# Patient Record
Sex: FEMALE | Race: BLACK OR AFRICAN AMERICAN | NOT HISPANIC OR LATINO | URBAN - METROPOLITAN AREA
[De-identification: names, ages, dates, MRNs, and addresses within clinical notes are randomized per-mention and may not be internally consistent; named-entity substitution may affect disease eponyms.]

---

## 2020-07-17 ENCOUNTER — OUTPATIENT (OUTPATIENT)
Dept: OUTPATIENT SERVICES | Facility: HOSPITAL | Age: 35
LOS: 1 days | Discharge: HOME | End: 2020-07-17
Payer: COMMERCIAL

## 2020-07-17 DIAGNOSIS — D25.9 LEIOMYOMA OF UTERUS, UNSPECIFIED: ICD-10-CM

## 2020-07-17 PROCEDURE — 76830 TRANSVAGINAL US NON-OB: CPT | Mod: 26

## 2020-10-07 ENCOUNTER — OUTPATIENT (OUTPATIENT)
Dept: OUTPATIENT SERVICES | Facility: HOSPITAL | Age: 35
LOS: 1 days | Discharge: HOME | End: 2020-10-07
Payer: COMMERCIAL

## 2020-10-07 DIAGNOSIS — D25.9 LEIOMYOMA OF UTERUS, UNSPECIFIED: ICD-10-CM

## 2020-10-07 PROCEDURE — 76830 TRANSVAGINAL US NON-OB: CPT | Mod: 26

## 2021-11-30 ENCOUNTER — EMERGENCY (EMERGENCY)
Facility: HOSPITAL | Age: 36
LOS: 0 days | Discharge: HOME | End: 2021-11-30
Attending: EMERGENCY MEDICINE | Admitting: EMERGENCY MEDICINE
Payer: COMMERCIAL

## 2021-11-30 VITALS
OXYGEN SATURATION: 99 % | HEART RATE: 56 BPM | DIASTOLIC BLOOD PRESSURE: 66 MMHG | TEMPERATURE: 98 F | RESPIRATION RATE: 18 BRPM | SYSTOLIC BLOOD PRESSURE: 119 MMHG

## 2021-11-30 DIAGNOSIS — R11.2 NAUSEA WITH VOMITING, UNSPECIFIED: ICD-10-CM

## 2021-11-30 DIAGNOSIS — R63.0 ANOREXIA: ICD-10-CM

## 2021-11-30 DIAGNOSIS — Z88.8 ALLERGY STATUS TO OTHER DRUGS, MEDICAMENTS AND BIOLOGICAL SUBSTANCES STATUS: ICD-10-CM

## 2021-11-30 LAB
ALBUMIN SERPL ELPH-MCNC: 4.1 G/DL — SIGNIFICANT CHANGE UP (ref 3.5–5.2)
ALP SERPL-CCNC: 48 U/L — SIGNIFICANT CHANGE UP (ref 30–115)
ALT FLD-CCNC: 16 U/L — SIGNIFICANT CHANGE UP (ref 0–41)
ANION GAP SERPL CALC-SCNC: 15 MMOL/L — HIGH (ref 7–14)
APPEARANCE UR: CLEAR — SIGNIFICANT CHANGE UP
AST SERPL-CCNC: 26 U/L — SIGNIFICANT CHANGE UP (ref 0–41)
BASOPHILS # BLD AUTO: 0 K/UL — SIGNIFICANT CHANGE UP (ref 0–0.2)
BASOPHILS NFR BLD AUTO: 0 % — SIGNIFICANT CHANGE UP (ref 0–1)
BILIRUB SERPL-MCNC: 0.2 MG/DL — SIGNIFICANT CHANGE UP (ref 0.2–1.2)
BILIRUB UR-MCNC: NEGATIVE — SIGNIFICANT CHANGE UP
BUN SERPL-MCNC: 6 MG/DL — LOW (ref 10–20)
CALCIUM SERPL-MCNC: 8.7 MG/DL — SIGNIFICANT CHANGE UP (ref 8.5–10.1)
CHLORIDE SERPL-SCNC: 101 MMOL/L — SIGNIFICANT CHANGE UP (ref 98–110)
CO2 SERPL-SCNC: 20 MMOL/L — SIGNIFICANT CHANGE UP (ref 17–32)
COLOR SPEC: SIGNIFICANT CHANGE UP
CREAT SERPL-MCNC: 0.8 MG/DL — SIGNIFICANT CHANGE UP (ref 0.7–1.5)
DIFF PNL FLD: NEGATIVE — SIGNIFICANT CHANGE UP
EOSINOPHIL # BLD AUTO: 0.04 K/UL — SIGNIFICANT CHANGE UP (ref 0–0.7)
EOSINOPHIL NFR BLD AUTO: 1.7 % — SIGNIFICANT CHANGE UP (ref 0–8)
GIANT PLATELETS BLD QL SMEAR: PRESENT — SIGNIFICANT CHANGE UP
GLUCOSE SERPL-MCNC: 92 MG/DL — SIGNIFICANT CHANGE UP (ref 70–99)
GLUCOSE UR QL: NEGATIVE — SIGNIFICANT CHANGE UP
HCT VFR BLD CALC: 40.8 % — SIGNIFICANT CHANGE UP (ref 37–47)
HGB BLD-MCNC: 13.5 G/DL — SIGNIFICANT CHANGE UP (ref 12–16)
KETONES UR-MCNC: NEGATIVE — SIGNIFICANT CHANGE UP
LACTATE SERPL-SCNC: 0.9 MMOL/L — SIGNIFICANT CHANGE UP (ref 0.7–2)
LEUKOCYTE ESTERASE UR-ACNC: NEGATIVE — SIGNIFICANT CHANGE UP
LIDOCAIN IGE QN: 15 U/L — SIGNIFICANT CHANGE UP (ref 7–60)
LYMPHOCYTES # BLD AUTO: 1.48 K/UL — SIGNIFICANT CHANGE UP (ref 1.2–3.4)
LYMPHOCYTES # BLD AUTO: 56.4 % — HIGH (ref 20.5–51.1)
MACROCYTES BLD QL: SLIGHT — SIGNIFICANT CHANGE UP
MANUAL SMEAR VERIFICATION: SIGNIFICANT CHANGE UP
MCHC RBC-ENTMCNC: 30.8 PG — SIGNIFICANT CHANGE UP (ref 27–31)
MCHC RBC-ENTMCNC: 33.1 G/DL — SIGNIFICANT CHANGE UP (ref 32–37)
MCV RBC AUTO: 92.9 FL — SIGNIFICANT CHANGE UP (ref 81–99)
MICROCYTES BLD QL: SLIGHT — SIGNIFICANT CHANGE UP
MONOCYTES # BLD AUTO: 0.16 K/UL — SIGNIFICANT CHANGE UP (ref 0.1–0.6)
MONOCYTES NFR BLD AUTO: 6 % — SIGNIFICANT CHANGE UP (ref 1.7–9.3)
NEUTROPHILS # BLD AUTO: 0.61 K/UL — LOW (ref 1.4–6.5)
NEUTROPHILS NFR BLD AUTO: 20.5 % — LOW (ref 42.2–75.2)
NEUTS BAND # BLD: 2.6 % — SIGNIFICANT CHANGE UP (ref 0–6)
NITRITE UR-MCNC: NEGATIVE — SIGNIFICANT CHANGE UP
OVALOCYTES BLD QL SMEAR: SLIGHT — SIGNIFICANT CHANGE UP
PH UR: 6.5 — SIGNIFICANT CHANGE UP (ref 5–8)
PLAT MORPH BLD: NORMAL — SIGNIFICANT CHANGE UP
PLATELET # BLD AUTO: 240 K/UL — SIGNIFICANT CHANGE UP (ref 130–400)
POLYCHROMASIA BLD QL SMEAR: SLIGHT — SIGNIFICANT CHANGE UP
POTASSIUM SERPL-MCNC: 4.3 MMOL/L — SIGNIFICANT CHANGE UP (ref 3.5–5)
POTASSIUM SERPL-SCNC: 4.3 MMOL/L — SIGNIFICANT CHANGE UP (ref 3.5–5)
PROT SERPL-MCNC: 6.8 G/DL — SIGNIFICANT CHANGE UP (ref 6–8)
PROT UR-MCNC: NEGATIVE — SIGNIFICANT CHANGE UP
RBC # BLD: 4.39 M/UL — SIGNIFICANT CHANGE UP (ref 4.2–5.4)
RBC # FLD: 12.2 % — SIGNIFICANT CHANGE UP (ref 11.5–14.5)
RBC BLD AUTO: ABNORMAL
SODIUM SERPL-SCNC: 136 MMOL/L — SIGNIFICANT CHANGE UP (ref 135–146)
SP GR SPEC: 1.01 — SIGNIFICANT CHANGE UP (ref 1.01–1.03)
UROBILINOGEN FLD QL: SIGNIFICANT CHANGE UP
VARIANT LYMPHS # BLD: 12.8 % — HIGH (ref 0–5)
WBC # BLD: 2.62 K/UL — LOW (ref 4.8–10.8)
WBC # FLD AUTO: 2.62 K/UL — LOW (ref 4.8–10.8)

## 2021-11-30 PROCEDURE — 99284 EMERGENCY DEPT VISIT MOD MDM: CPT

## 2021-11-30 RX ORDER — SODIUM CHLORIDE 9 MG/ML
1000 INJECTION INTRAMUSCULAR; INTRAVENOUS; SUBCUTANEOUS ONCE
Refills: 0 | Status: COMPLETED | OUTPATIENT
Start: 2021-11-30 | End: 2021-11-30

## 2021-11-30 RX ORDER — ONDANSETRON 8 MG/1
4 TABLET, FILM COATED ORAL ONCE
Refills: 0 | Status: COMPLETED | OUTPATIENT
Start: 2021-11-30 | End: 2021-11-30

## 2021-11-30 RX ADMIN — ONDANSETRON 4 MILLIGRAM(S): 8 TABLET, FILM COATED ORAL at 13:49

## 2021-11-30 RX ADMIN — SODIUM CHLORIDE 1000 MILLILITER(S): 9 INJECTION INTRAMUSCULAR; INTRAVENOUS; SUBCUTANEOUS at 13:49

## 2021-11-30 NOTE — ED PROVIDER NOTE - CARE PROVIDER_API CALL
Chayo Sanabria (MD)  Gastroenterology  [address] [place] [address]  Phone: [phone]  Fax: [phone]  Follow Up Time: 1-3 Days

## 2021-11-30 NOTE — ED PROVIDER NOTE - NSFOLLOWUPINSTRUCTIONS_ED_ALL_ED_FT

## 2021-11-30 NOTE — ED PROVIDER NOTE - NS ED ROS FT
Constitutional: (-) fever (-) malaise (-) diaphoresis (-) chills (-) wt. loss (-) body aches (-) night sweats  Eyes: (-) visual changes (-) eye pain (-) eye discharge (-) photophobia (-) FB sensation  ENMT: (-) nasal or chest congestion (-) runny nose (-) sore throat (-) hoarseness  (-) hearing changes (-) ear pain (-) ear discharge or infections (-) neck pain (-) neck stiffness  Cardiac: (-) chest pain  (-) palpitations (-) syncope (-) edema  Respiratory: (-) cough (-) SOB (-) LAW  GI: (+) nausea (+) vomiting (-) diarrhea (-) abdominal pain  : (-) dysuria (-) increased frequency  (-) hematuria (-) incontinence  Neuro: (-) headache (-) dizziness (-) numbness/tingling to extremities B/L (-) weakness  Skin: (-) rash (-) laceration  GYN: (-) pelvic pain (-) abnormal bleeding (-) abnormal discharge or odor  Except as documented in the HPI, all other systems are negative.

## 2021-11-30 NOTE — ED PROVIDER NOTE - OBJECTIVE STATEMENT
35 yo F no pmhx presenting to the ED for evaluation of intermittent nausea and NBNB vomiting x 4 days. Pt was seen at [facility] and diagnosed with viral gastroenteritis, pt given PO zofran, states after taking zofran this am she had an episode of vomiting, unable to tolerate PO, poor appetite. Pt denies any sick contacts. Pt reports a few days ago she had chills, resolved. Denies any alleviating or provoking factors. LMP 11/11/21. Denies fever, abd pain, diarrhea, chest pain, sob, dysuria, hematuria, vaginal bleeding, vaginal discharge.

## 2021-11-30 NOTE — ED PROVIDER NOTE - PATIENT PORTAL LINK FT
You can access the FollowMyHealth Patient Portal offered by [facility] by registering at the following website: [email or web address]. By joining [employer]’s FollowMyHealth portal, you will also be able to view your health information using other applications (apps) compatible with our system.

## 2021-11-30 NOTE — ED PROVIDER NOTE - PHYSICAL EXAMINATION
GENERAL: Well-nourished, Well-developed. NAD.  HEAD: No visible or palpable bumps or hematomas. No ecchymosis behind ears B/L.  Eyes: PERRLA, EOMI. No asymmetry. No nystagmus. No conjunctival injection. Non-icteric sclera.  ENMT: MMM.   Neck: Supple. FROM  CVS: RRR. Normal S1,S2. No murmurs appreciated on auscultation   RESP: Lungs clear to auscultation B/L. No wheezing, rales, or rhonchi auscultated.  GI: Normal auscultation of bowel sounds in all 4 quadrants. Soft, Nontender, Nondistended. No guarding or rebound tenderness. No CVAT B/L.  Skin: Warm, Dry. No rashes or lesions. Good cap refill < 2 sec B/L.  EXT: Radial and pedal pulses present B/L. No calf tenderness or swelling B/L. No palpable cords. No pedal edema B/L.  Neuro: AA&O x 3. Sensation grossly intact. Strength 5/5 B/L. Gait within normal limits.

## 2021-11-30 NOTE — ED PROVIDER NOTE - PROGRESS NOTE DETAILS
NC: Pt feeling much better, would like to be discharged at this time. abdomen soft, nontender. VSS. tolerating PO Attending Note: I personally evaluated the patient. I reviewed the Physician Assistant’s note (as assigned above), and agree with the findings and plan except as documented in my note.   35 y/o F now with viral gastroenteritis presents for NBNB vomiting. Reports no specific abd pain. Exam: NT, well appearing, pink conjunctiva, anicteric, abd soft. Plan: Labs, reassess.

## 2021-12-01 LAB
CULTURE RESULTS: SIGNIFICANT CHANGE UP
SPECIMEN SOURCE: SIGNIFICANT CHANGE UP

## 2024-02-23 ENCOUNTER — APPOINTMENT (OUTPATIENT)
Dept: ORTHOPEDIC SURGERY | Facility: CLINIC | Age: 39
End: 2024-02-23
Payer: COMMERCIAL

## 2024-02-23 ENCOUNTER — NON-APPOINTMENT (OUTPATIENT)
Age: 39
End: 2024-02-23

## 2024-02-23 VITALS — BODY MASS INDEX: 32.39 KG/M2 | WEIGHT: 165 LBS | HEIGHT: 60 IN

## 2024-02-23 DIAGNOSIS — M25.561 PAIN IN RIGHT KNEE: ICD-10-CM

## 2024-02-23 DIAGNOSIS — M25.571 PAIN IN RIGHT ANKLE AND JOINTS OF RIGHT FOOT: ICD-10-CM

## 2024-02-23 DIAGNOSIS — G89.29 PAIN IN RIGHT KNEE: ICD-10-CM

## 2024-02-23 DIAGNOSIS — G89.29 PAIN IN RIGHT ANKLE AND JOINTS OF RIGHT FOOT: ICD-10-CM

## 2024-02-23 PROBLEM — Z00.00 ENCOUNTER FOR PREVENTIVE HEALTH EXAMINATION: Status: ACTIVE | Noted: 2024-02-23

## 2024-02-23 PROCEDURE — 73610 X-RAY EXAM OF ANKLE: CPT | Mod: RT

## 2024-02-23 PROCEDURE — 99203 OFFICE O/P NEW LOW 30 MIN: CPT

## 2024-02-23 PROCEDURE — 73560 X-RAY EXAM OF KNEE 1 OR 2: CPT | Mod: RT

## 2024-02-23 NOTE — IMAGING
[de-identified] : [place] young woman walks into my office no distress.  Physical examination: Right knee: No joint line tenderness to palpation along medial and lateral joint line.  Mildly abnormal patellofemoral grind test.  Most of her tenderness along the patella tendon.  No significant synovial thickening.  No effusion.  Negative Apley's and Marc's test.  Negative Lachman test.  No varus valgus instability.  Negative Lachman test.  Knee motion 0-140 degrees without associated pain.  Calf soft no cords.  No geniculate lymph nodes or masses.  Gait nonantalgic.  Right ankle: No synovial thickening.  No effusion.  Full range of motion (dorsiflexion 20 degrees plantarflexion 40 degrees inversion 30 degrees inversion 20 degrees.  Some mild discomfort with forced eversion.  Provocative testing of the collateral ligaments laterally does not elicit discomfort but the patient localizes her pain to the distal fibulocalcaneal ligaments.  No swelling in this area.  Negative anterior drawer test.  Midfoot supple.  Arch acceptable.  Radiographs: Right knee (AP, lateral): No stigmata of arthritis.  No evidence of fracture.  No bony abnormalities.  Joint space greater than 5 mm medially and laterally.  No osteophytes around the patellofemoral joint.  Right ankle (AP, lateral, mortise): Beaking distal aspect of fibula consistent with prior injury to deep fibulocalcaneal ligaments.  Otherwise unremarkable examination.  No evidence of arthritis within the joint.  No other bony abnormalities.

## 2024-02-23 NOTE — HISTORY OF PRESENT ILLNESS
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  She notes the pain occurs mostly when she tries to run.  She is tried an ankle brace which may mitigate the pain somewhat.  Does not routinely take any medication for the pain.  Does not have pain at rest or with regular activities.  No clear symptoms of giving way or locking.  No history of trauma.  Does admit that she is put on a little bit of weight since she has had a back weight from her running.  Despite a history of running for prolonged distances in the past she is now down to 3 miles at a time to time basis.  Does not routinely stretch before or after running.  Has not had physical therapy.  Does not routinely take any medication for the pain.  Past medical history: Denies all.  Regular medical follow-up.  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

## 2024-02-23 NOTE — ASSESSMENT
[FreeTextEntry1] : 39-year-old avid runner with complaints of knee and ankle pain.  Knee pain is most likely related to patella tendinitis.  Recommend a course of physical therapy.  Judicious use of NSAIDs.  Physical examination and radiographic examination of the patient's right ankle suggested old deep lateral ankle sprain.  Now has some mild residual pain.  Recommend physical therapy focusing on ankle stabilization.  Will have her follow-up in my office in 6 months.  If she is doing particular well can cancel follow-up visit.  No new x-rays required.

## 2024-08-07 ENCOUNTER — TRANSCRIPTION ENCOUNTER (OUTPATIENT)
Age: 39
End: 2024-08-07

## 2024-08-07 ENCOUNTER — INPATIENT (INPATIENT)
Facility: HOSPITAL | Age: 39
LOS: 0 days | Discharge: HOME | DRG: 596 | End: 2024-08-08
Attending: STUDENT IN AN ORGANIZED HEALTH CARE EDUCATION/TRAINING PROGRAM | Admitting: STUDENT IN AN ORGANIZED HEALTH CARE EDUCATION/TRAINING PROGRAM
Payer: COMMERCIAL

## 2024-08-07 VITALS
DIASTOLIC BLOOD PRESSURE: 93 MMHG | RESPIRATION RATE: 14 BRPM | SYSTOLIC BLOOD PRESSURE: 160 MMHG | WEIGHT: 167.99 LBS | TEMPERATURE: 98 F | HEART RATE: 65 BPM | OXYGEN SATURATION: 99 %

## 2024-08-07 VITALS
TEMPERATURE: 98 F | SYSTOLIC BLOOD PRESSURE: 148 MMHG | HEART RATE: 82 BPM | RESPIRATION RATE: 18 BRPM | DIASTOLIC BLOOD PRESSURE: 72 MMHG | OXYGEN SATURATION: 98 %

## 2024-08-07 DIAGNOSIS — Z98.890 OTHER SPECIFIED POSTPROCEDURAL STATES: Chronic | ICD-10-CM

## 2024-08-07 DIAGNOSIS — R21 RASH AND OTHER NONSPECIFIC SKIN ERUPTION: ICD-10-CM

## 2024-08-07 DIAGNOSIS — Z98.891 HISTORY OF UTERINE SCAR FROM PREVIOUS SURGERY: Chronic | ICD-10-CM

## 2024-08-07 LAB
ALBUMIN SERPL ELPH-MCNC: 4.3 G/DL — SIGNIFICANT CHANGE UP (ref 3.5–5.2)
ALP SERPL-CCNC: 64 U/L — SIGNIFICANT CHANGE UP (ref 30–115)
ALT FLD-CCNC: 14 U/L — SIGNIFICANT CHANGE UP (ref 0–41)
ANION GAP SERPL CALC-SCNC: 10 MMOL/L — SIGNIFICANT CHANGE UP (ref 7–14)
AST SERPL-CCNC: 14 U/L — SIGNIFICANT CHANGE UP (ref 0–41)
BASOPHILS # BLD AUTO: 0.04 K/UL — SIGNIFICANT CHANGE UP (ref 0–0.2)
BASOPHILS NFR BLD AUTO: 0.5 % — SIGNIFICANT CHANGE UP (ref 0–1)
BILIRUB SERPL-MCNC: 0.3 MG/DL — SIGNIFICANT CHANGE UP (ref 0.2–1.2)
BUN SERPL-MCNC: 15 MG/DL — SIGNIFICANT CHANGE UP (ref 10–20)
CALCIUM SERPL-MCNC: 9.4 MG/DL — SIGNIFICANT CHANGE UP (ref 8.4–10.4)
CHLORIDE SERPL-SCNC: 104 MMOL/L — SIGNIFICANT CHANGE UP (ref 98–110)
CO2 SERPL-SCNC: 26 MMOL/L — SIGNIFICANT CHANGE UP (ref 17–32)
CREAT SERPL-MCNC: 0.8 MG/DL — SIGNIFICANT CHANGE UP (ref 0.7–1.5)
EGFR: 95 ML/MIN/1.73M2 — SIGNIFICANT CHANGE UP
EOSINOPHIL # BLD AUTO: 0.01 K/UL — SIGNIFICANT CHANGE UP (ref 0–0.7)
EOSINOPHIL NFR BLD AUTO: 0.1 % — SIGNIFICANT CHANGE UP (ref 0–8)
ERYTHROCYTE [SEDIMENTATION RATE] IN BLOOD: 4 MM/HR — SIGNIFICANT CHANGE UP (ref 0–20)
GLUCOSE SERPL-MCNC: 98 MG/DL — SIGNIFICANT CHANGE UP (ref 70–99)
HCT VFR BLD CALC: 37.9 % — SIGNIFICANT CHANGE UP (ref 37–47)
HGB BLD-MCNC: 12.6 G/DL — SIGNIFICANT CHANGE UP (ref 12–16)
IMM GRANULOCYTES NFR BLD AUTO: 0.1 % — SIGNIFICANT CHANGE UP (ref 0.1–0.3)
LYMPHOCYTES # BLD AUTO: 3.38 K/UL — SIGNIFICANT CHANGE UP (ref 1.2–3.4)
LYMPHOCYTES # BLD AUTO: 43.7 % — SIGNIFICANT CHANGE UP (ref 20.5–51.1)
MCHC RBC-ENTMCNC: 30.4 PG — SIGNIFICANT CHANGE UP (ref 27–31)
MCHC RBC-ENTMCNC: 33.2 G/DL — SIGNIFICANT CHANGE UP (ref 32–37)
MCV RBC AUTO: 91.5 FL — SIGNIFICANT CHANGE UP (ref 81–99)
MONOCYTES # BLD AUTO: 0.46 K/UL — SIGNIFICANT CHANGE UP (ref 0.1–0.6)
MONOCYTES NFR BLD AUTO: 6 % — SIGNIFICANT CHANGE UP (ref 1.7–9.3)
NEUTROPHILS # BLD AUTO: 3.83 K/UL — SIGNIFICANT CHANGE UP (ref 1.4–6.5)
NEUTROPHILS NFR BLD AUTO: 49.6 % — SIGNIFICANT CHANGE UP (ref 42.2–75.2)
NRBC # BLD: 0 /100 WBCS — SIGNIFICANT CHANGE UP (ref 0–0)
PLATELET # BLD AUTO: 301 K/UL — SIGNIFICANT CHANGE UP (ref 130–400)
PMV BLD: 9.2 FL — SIGNIFICANT CHANGE UP (ref 7.4–10.4)
POTASSIUM SERPL-MCNC: 4 MMOL/L — SIGNIFICANT CHANGE UP (ref 3.5–5)
POTASSIUM SERPL-SCNC: 4 MMOL/L — SIGNIFICANT CHANGE UP (ref 3.5–5)
PROT SERPL-MCNC: 6.6 G/DL — SIGNIFICANT CHANGE UP (ref 6–8)
RBC # BLD: 4.14 M/UL — LOW (ref 4.2–5.4)
RBC # FLD: 13.1 % — SIGNIFICANT CHANGE UP (ref 11.5–14.5)
SODIUM SERPL-SCNC: 140 MMOL/L — SIGNIFICANT CHANGE UP (ref 135–146)
WBC # BLD: 7.73 K/UL — SIGNIFICANT CHANGE UP (ref 4.8–10.8)
WBC # FLD AUTO: 7.73 K/UL — SIGNIFICANT CHANGE UP (ref 4.8–10.8)

## 2024-08-07 PROCEDURE — 99285 EMERGENCY DEPT VISIT HI MDM: CPT

## 2024-08-07 PROCEDURE — 83520 IMMUNOASSAY QUANT NOS NONAB: CPT

## 2024-08-07 PROCEDURE — 86635 COCCIDIOIDES ANTIBODY: CPT

## 2024-08-07 PROCEDURE — 93005 ELECTROCARDIOGRAM TRACING: CPT

## 2024-08-07 PROCEDURE — 87529 HSV DNA AMP PROBE: CPT

## 2024-08-07 PROCEDURE — 36415 COLL VENOUS BLD VENIPUNCTURE: CPT

## 2024-08-07 PROCEDURE — 86038 ANTINUCLEAR ANTIBODIES: CPT

## 2024-08-07 PROCEDURE — 99223 1ST HOSP IP/OBS HIGH 75: CPT

## 2024-08-07 PROCEDURE — 99282 EMERGENCY DEPT VISIT SF MDM: CPT

## 2024-08-07 PROCEDURE — 86695 HERPES SIMPLEX TYPE 1 TEST: CPT

## 2024-08-07 PROCEDURE — 93010 ELECTROCARDIOGRAM REPORT: CPT

## 2024-08-07 PROCEDURE — 86780 TREPONEMA PALLIDUM: CPT

## 2024-08-07 PROCEDURE — 80074 ACUTE HEPATITIS PANEL: CPT

## 2024-08-07 PROCEDURE — 87389 HIV-1 AG W/HIV-1&-2 AB AG IA: CPT

## 2024-08-07 PROCEDURE — 86696 HERPES SIMPLEX TYPE 2 TEST: CPT

## 2024-08-07 PROCEDURE — 86738 MYCOPLASMA ANTIBODY: CPT

## 2024-08-07 PROCEDURE — G0378: CPT

## 2024-08-07 RX ORDER — DIPHENHYDRAMINE HYDROCHLORIDE AND LIDOCAINE HYDROCHLORIDE AND ALUMINUM HYDROXIDE AND MAGNESIUM HYDRO
15 KIT ONCE
Refills: 0 | Status: COMPLETED | OUTPATIENT
Start: 2024-08-07 | End: 2024-08-07

## 2024-08-07 RX ORDER — ACYCLOVIR 800 MG
400 TABLET ORAL EVERY 8 HOURS
Refills: 0 | Status: DISCONTINUED | OUTPATIENT
Start: 2024-08-07 | End: 2024-08-08

## 2024-08-07 RX ORDER — KETOROLAC TROMETHAMINE 10 MG
10 TABLET ORAL ONCE
Refills: 0 | Status: DISCONTINUED | OUTPATIENT
Start: 2024-08-07 | End: 2024-08-07

## 2024-08-07 RX ORDER — MAGNESIUM, ALUMINUM HYDROXIDE 200-225/5
30 SUSPENSION, ORAL (FINAL DOSE FORM) ORAL EVERY 4 HOURS
Refills: 0 | Status: ACTIVE | OUTPATIENT
Start: 2024-08-07 | End: 2025-07-06

## 2024-08-07 RX ORDER — CLOBETASOL PROPIONATE 0.5 MG/G
1 EMULSION TOPICAL
Refills: 0 | Status: DISCONTINUED | OUTPATIENT
Start: 2024-08-07 | End: 2024-08-08

## 2024-08-07 RX ORDER — MELATONIN 3 MG
3 TABLET ORAL AT BEDTIME
Refills: 0 | Status: ACTIVE | OUTPATIENT
Start: 2024-08-07 | End: 2025-07-06

## 2024-08-07 RX ORDER — ACETAMINOPHEN 500 MG
650 TABLET ORAL EVERY 6 HOURS
Refills: 0 | Status: ACTIVE | OUTPATIENT
Start: 2024-08-07 | End: 2025-07-06

## 2024-08-07 RX ORDER — DIPHENHYDRAMINE HCL 25 MG
50 CAPSULE ORAL ONCE
Refills: 0 | Status: COMPLETED | OUTPATIENT
Start: 2024-08-07 | End: 2024-08-07

## 2024-08-07 RX ORDER — DIPHENHYDRAMINE HCL 25 MG
25 CAPSULE ORAL ONCE
Refills: 0 | Status: COMPLETED | OUTPATIENT
Start: 2024-08-07 | End: 2024-08-07

## 2024-08-07 RX ORDER — AZITHROMYCIN 250 MG
500 TABLET ORAL DAILY
Refills: 0 | Status: DISCONTINUED | OUTPATIENT
Start: 2024-08-07 | End: 2024-08-08

## 2024-08-07 RX ORDER — KETOROLAC TROMETHAMINE 10 MG
30 TABLET ORAL ONCE
Refills: 0 | Status: DISCONTINUED | OUTPATIENT
Start: 2024-08-07 | End: 2024-08-07

## 2024-08-07 RX ORDER — ONDANSETRON HCL/PF 4 MG/2 ML
4 VIAL (ML) INJECTION EVERY 8 HOURS
Refills: 0 | Status: ACTIVE | OUTPATIENT
Start: 2024-08-07 | End: 2025-07-06

## 2024-08-07 RX ADMIN — Medication 500 MILLIGRAM(S): at 22:09

## 2024-08-07 RX ADMIN — CLOBETASOL PROPIONATE 1 APPLICATION(S): 0.5 EMULSION TOPICAL at 22:09

## 2024-08-07 RX ADMIN — Medication 30 MILLIGRAM(S): at 04:02

## 2024-08-07 RX ADMIN — Medication 30 MILLILITER(S): at 11:45

## 2024-08-07 RX ADMIN — Medication 650 MILLIGRAM(S): at 11:45

## 2024-08-07 RX ADMIN — DIPHENHYDRAMINE HYDROCHLORIDE AND LIDOCAINE HYDROCHLORIDE AND ALUMINUM HYDROXIDE AND MAGNESIUM HYDRO 15 MILLILITER(S): KIT at 20:23

## 2024-08-07 RX ADMIN — Medication 650 MILLIGRAM(S): at 19:06

## 2024-08-07 RX ADMIN — Medication 10 MILLIGRAM(S): at 20:23

## 2024-08-07 RX ADMIN — Medication 1 SPRAY(S): at 12:00

## 2024-08-07 RX ADMIN — Medication 102 MILLIGRAM(S): at 04:02

## 2024-08-07 RX ADMIN — Medication 25 MILLIGRAM(S): at 14:00

## 2024-08-07 RX ADMIN — Medication 50 MILLIGRAM(S): at 07:00

## 2024-08-07 RX ADMIN — Medication 30 MILLIGRAM(S): at 07:00

## 2024-08-07 RX ADMIN — Medication 400 MILLIGRAM(S): at 22:09

## 2024-08-07 NOTE — CONSULT NOTE ADULT - ATTENDING COMMENTS
Patient is a 39-year-old female w/ hx significant for Erythema multiforme (2023) who presents for worsening, diffuse painful rash with vesicles w/ mucosal involvement.    Derm consulted for diffuse erythematous rash that looks like target lesions with vesicles in the center of some lesions, with oral mucosa involvement in the context of 5 recurrent milder episodes of Erythema multiforme minor in the past year with no previous URI and reporting serology with Ob/Gyn showing past HSV exposure. Patient also had URI Sx about 1 week ago and took augmentin for 5 days then traveled to [place] 8/2-8/6. Denies any fever, chills, headaches, vision problems. Reports burning pain and itching around the rashes. She saw a dermatologist multiple times for recurrent EM and has tried multiple cream including Clobetasol, Triamcinolone, Betamethasone and Fluocinonide which in general help. 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      ASSESSMENT AND PLAN:  Reoccuring Erythema multiforme minor, with no other mucosal involvement at this time other than than L lip/ mucosal involvement- likely secondary to  HSV; r/o mycoplasma:    - Continue antiviral- inpatient Acyclovir or outpatient Valacyclovir erythema multiforme dose, as per ID recommendations- patient would like to be discharged soon to take care of her daughther and take the final exam Thursday. Agree with Azithrhomicin recommended for ID, it might help with healing of skin erosions like on the R elbow too.   - f/u on testing but m-pox unlikely in the context of 5 episodes of recurrent erythema multiforme lesions with target lesions.  - Consider topical steroids to decrease the tender inflammation as with past episodes it helped- Clobetasol or Fluocinonide cream, lidocaine 2% mouth wash or maalox for painful oral lesions  - Consider Claritin qam and Benadryl qhs for itching  Please call with any questions or worsening of skin lesions.
40 yo female w/ hx significant for Erythema multiforme (2023) presenting for worsening, diffuse painful rash with vesicles w/ mucosal involvement. Over the past year, patient has had 5 episodes of a rash that begins as a localized macule/papule that then spreads to diffuse targetoid lesions w/ vesicles. A few months ago, her dermatologist prescribed clobetasol and the rash improved.  She went to [place] on 8/2 and she noticed a red macule on her right elbow and was uncertain if she got bit by a mosquito. Her rash began spreading similar to prior episodes; she went to a pharmacy in [facility] where she purchased betamethasone, stated it helped a little bit. Pt stated she had a sore throat and SOB for one week for which she took Augmentin x 5 days one week before she left for [facility].    After returning from [facility], she noticed that the rash was getting worse with more vesicles so she went to the ED. She denies fevers, chills, myalgias, arthralgias; reports burning in her mouth from the ulcers; rash is on lips, L buccal mucosa, anterior/posterior thorax, and b/l UE/LE with palms and soles affected. She denies sick contacts; she lives with her daughter who has never had any rashes/lesions similar to hers. Per pt no other travel companions have a similar rash.     IMPRESSION  #Erythema Multiforme 2/2 mycoplasma pneumoniae   - lesions not c/w erythema migrans, denies tick bites   - worsening targetoid lesions w/ vesicles that evolved from mac/pap on R elbow suspicious for EM  - s/p clobetasol, betamethasone, and benadryl w/o improvement  - URI symptoms 1 wk prior to travel     RECOMMENDATIONS  -Azithromycin  mg  -Benzocaine spray  -Magic mouthwash

## 2024-08-07 NOTE — ED PROVIDER NOTE - OBJECTIVE STATEMENT
patient is a 40yo female no sig PMH coming to ED for rash. pt reports painful, itchy rash beginning on right elbow spreading all over body including in her mouth and on palms x5 days. pt notes "target lesions" that have blisters in the middle of them. of note pt was on Augmentin x10 days ago for 5 days, traveled to [facility] 8/2 and felt a bug bite to right elbow where rash began. pt reports having similar rash intermittently over the past but never with blisters, has seen dermatologist and prescribed topical medications. denies fever, eye involvement, chest pain, SOB, weakness, abdominal pain, n,v,d,c.

## 2024-08-07 NOTE — ED PROVIDER NOTE - PHYSICAL EXAMINATION
CONST: in NAD  EYES: EOMI, Sclera and conjunctiva clear.   ENT: No nasal discharge. TM's clear B/L without drainage. erythematous lesions to left buccal mucosa and tongue  NECK: Non-tender  CARD: Normal S1 S2; Normal rate and rhythm  RESP: Equal BS B/L, No wheezes, rhonchi or rales. No distress  GI: Soft, non-distended.  MS: Normal ROM in all extremities  SKIN: Warm, dry. diffuse erythematous blanchable target lesions with central vesicle to b/l UE, b/l LE, trunk, face, palms,   NEURO: A&Ox3, No focal deficits. Strength 5/5 with no sensory deficits. Steady gait

## 2024-08-07 NOTE — ED PROVIDER NOTE - CLINICAL SUMMARY MEDICAL DECISION MAKING FREE TEXT BOX
evaluated for rash worsening involving mucosa, infectious related vs drug reaction, will require monitoring for progression and derm/id consult

## 2024-08-07 NOTE — H&P ADULT - ATTENDING COMMENTS
pt is here because of diffuse target like lesions on her body including her arms, hands, and legs. Pt is a 40 yo F with no significant pmhx. She states that this is not the first time she has had a rash like this. The rash this time started as a red macule on her right elbow. She previously had rx for clobetasol and the rash improved. She went to [place] on 8/2 and she started to get the rash. She also notes that she may have had an URI and doctors in [facility] prescribed her augmentin. The rash was worsening when she arrived back to the [place] and spread more and states she started to experience ulcers in the mouth/rash on lips. 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     Vitals: HD stable, O2 stable  Gen: appropriate affect, no acute distress  Neuro: no focal defects, no sensory deficits  HEENT: EOMI, no JVD, normocephalic, atraumatic, no scleral icterus, no lesions appreciated in the mouth  Cardio: RRR, S1 S2 present, no murmurs, rubs, or gallops  Resp: lungs b/l CTA, chest wall intact  Abd: soft, nondistended, nontender  MSK: no gross joint abnormalities, no obvious swelling  Skin: diffuse target like lesions on the upper extremities, hands, legs  Heme: no ecchymosis or petechiae present    erythema multiforme   -discussed with ID and derm  -starting azithromycin and acyclovir 8/7  -viral panel pending, hepatitis, herpes panel, autoimmune panel ([facility], RF), infectious disease panel (mycoplasma, fungal etiology)  -clobetasol cream to lesions    DVT ppx - CDI, ambulate as tolerated  DC planning - pending result panels    I personally reviewed labs and imaging and ordered necessary testing/medications  I discussed care of the patient with licensed providers  I personally reviewed chart and consultant recommendations  Pt has complex medical issues that require extensive diagnosis   I personally spent 50 minutes in care of patient.

## 2024-08-07 NOTE — CONSULT NOTE ADULT - SUBJECTIVE AND OBJECTIVE BOX
DIANA DIXON  39y, Female  Allergy: Compazine (Hives)      CHIEF COMPLAINT: Diffuse Rash w/ Mucosal involvement (07 Aug 2024 10:14)      HPI:  40 yo female w/ hx significant for Erythema multiforme () presenting for worsening, diffuse painful rash with vesicles w/ mucosal involvement. Over the past year, patient has had 5 episodes of a rash that begins as a localized macule/papule that then spreads to diffuse targetoid lesions w/ vesicles. A few months ago, her dermatologist prescribed clobetasol and the rash improved.  She went to [place] on  and she noticed a red macule on her right elbow and was uncertain if she got bit by a mosquito. Her rash began spreading similar to prior episodes; clobetasol did not work and doctors in [place] attempted betamethasone w/o improvement as well as augmentin. Of note, she states she was taking Augmentin x 5 days one week before she left for [facility] due to URI symptoms.     After returning from [facility], she noticed that the rash was still worsening with more vesicles than prior times and so she went to the ED. She denies fevers, chills, myalgias, arthralgias; reports burning in her mouth from the ulcers; rash is on lips, L buccal mucosa, anterior/posterior thorax, and b/l UE/LE with palms and soles affected. She denies sick contacts; she lives with her daughter who has never had any rashes/lesions similar to hers. Per pt no other travel companions have a similar rash.     Infectious Diseases History:  Old Micro Data/Cultures:     FAMILY HISTORY:  No pertinent family history in first degree relatives    PAST MEDICAL & SURGICAL HISTORY:  H/O erythema multiforme    H/O  section    Status post hysteroscopic myomectomy    SOCIAL HISTORY  Social History: not currently sexually active  Recent Travel: [place] (-)    ROS  General: Denies rigors, nightsweats  HEENT: Denies headache, rhinorrhea, sore throat, eye pain  CV: Denies CP, palpitations  PULM: Denies wheezing, hemoptysis  GI: Denies hematemesis, hematochezia, melena  : Denies discharge, hematuria  MSK: Denies arthralgias, myalgias  SKIN: painful, itchy, lesions all over body   NEURO: Denies paresthesias, weakness  PSYCH: Denies depression, anxiety    VITALS:  T(F): 97.4, Max: 98 (24 @ 02:52)  HR: 68  BP: 122/75  RR: 17Vital Signs Last 24 Hrs  T(C): 36.3 (07 Aug 2024 07:27), Max: 36.7 (07 Aug 2024 02:52)  T(F): 97.4 (07 Aug 2024 07:27), Max: 98 (07 Aug 2024 02:52)  HR: 68 (07 Aug 2024 07:27) (55 - 68)  BP: 122/75 (07 Aug 2024 07:27) (121/86 - 160/93)  BP(mean): --  RR: 17 (07 Aug 2024 07:27) (14 - 17)  SpO2: 99% (07 Aug 2024 07:27) (99% - 100%)    Parameters below as of 07 Aug 2024 07:27  Patient On (Oxygen Delivery Method): room air      PHYSICAL EXAM:  EYES: EOMI, Sclera and conjunctiva clear.   ENT: No nasal discharge. erythematous lesions to left buccal mucosa and tongue  NECK: Non-tender  CARD: Normal S1 S2; Normal rate and rhythm  RESP: Equal BS B/L, No wheezes, rhonchi or rales. No distress  GI: Soft, non-distended.  MS: Normal ROM in all extremities  SKIN: Warm, dry. diffuse erythematous blanchable target lesions with central vesicle to b/l UE, b/l LE, trunk, face, palms, ears  NEURO: A&Ox3, No focal deficits. Strength 5/5 with no sensory deficits. Steady gait    TESTS & MEASUREMENTS:                        12.6   7.73  )-----------( 301      ( 07 Aug 2024 05:19 )             37.9     08-    140  |  104  |  15  ----------------------------<  98  4.0   |  26  |  0.8    Ca    9.4      07 Aug 2024 05:19    TPro  6.6  /  Alb  4.3  /  TBili  0.3  /  DBili  x   /  AST  14  /  ALT  14  /  AlkPhos  64  08-07      LIVER FUNCTIONS - ( 07 Aug 2024 05:19 )  Alb: 4.3 g/dL / Pro: 6.6 g/dL / ALK PHOS: 64 U/L / ALT: 14 U/L / AST: 14 U/L / GGT: x           Urinalysis Basic - ( 07 Aug 2024 05:19 )    Color: x / Appearance: x / SG: x / pH: x  Gluc: 98 mg/dL / Ketone: x  / Bili: x / Urobili: x   Blood: x / Protein: x / Nitrite: x   Leuk Esterase: x / RBC: x / WBC x   Sq Epi: x / Non Sq Epi: x / Bacteria: x  INFECTIOUS DISEASES TESTING       DIANA DIXON  39y, Female  Allergy: Compazine (Hives)      CHIEF COMPLAINT: Diffuse Rash w/ Mucosal involvement (07 Aug 2024 10:14)      HPI:  40 yo female w/ hx significant for Erythema multiforme () presenting for worsening, diffuse painful rash with vesicles w/ mucosal involvement. Over the past year, patient has had 5 episodes of a rash that begins as a localized macule/papule that then spreads to diffuse targetoid lesions w/ vesicles. A few months ago, her dermatologist prescribed clobetasol and the rash improved.  She went to [place] on  and she noticed a red macule on her right elbow and was uncertain if she got bit by a mosquito. Her rash began spreading similar to prior episodes; she went to a pharmacy in [facility] where she purchased betamethasone, stated it helped a little bit. Of note, she states she was taking Augmentin x 5 days one week before she left for [facility] due to URI symptoms.     After returning from [facility], she noticed that the rash was sworsening with more vesicles so she went to the ED. She denies fevers, chills, myalgias, arthralgias; reports burning in her mouth from the ulcers; rash is on lips, L buccal mucosa, anterior/posterior thorax, and b/l UE/LE with palms and soles affected. She denies sick contacts; she lives with her daughter who has never had any rashes/lesions similar to hers. Per pt no other travel companions have a similar rash.     Infectious Diseases History:  Old Micro Data/Cultures:     FAMILY HISTORY:  No pertinent family history in first degree relatives    PAST MEDICAL & SURGICAL HISTORY:  H/O erythema multiforme    H/O  section    Status post hysteroscopic myomectomy    SOCIAL HISTORY  Social History: not currently sexually active  Recent Travel: [place] (-)    ROS  General: Denies rigors, nightsweats  HEENT: Denies headache, rhinorrhea, sore throat, eye pain  CV: Denies CP, palpitations  PULM: Denies wheezing, hemoptysis  GI: Denies hematemesis, hematochezia, melena  : Denies discharge, hematuria  MSK: Denies arthralgias, myalgias  SKIN: painful, itchy, lesions all over body   NEURO: Denies paresthesias, weakness  PSYCH: Denies depression, anxiety    VITALS:  T(F): 97.4, Max: 98 (24 @ 02:52)  HR: 68  BP: 122/75  RR: 17Vital Signs Last 24 Hrs  T(C): 36.3 (07 Aug 2024 07:27), Max: 36.7 (07 Aug 2024 02:52)  T(F): 97.4 (07 Aug 2024 07:27), Max: 98 (07 Aug 2024 02:52)  HR: 68 (07 Aug 2024 07:27) (55 - 68)  BP: 122/75 (07 Aug 2024 07:27) (121/86 - 160/93)  BP(mean): --  RR: 17 (07 Aug 2024 07:27) (14 - 17)  SpO2: 99% (07 Aug 2024 07:27) (99% - 100%)    Parameters below as of 07 Aug 2024 07:27  Patient On (Oxygen Delivery Method): room air      PHYSICAL EXAM:  EYES: EOMI, Sclera and conjunctiva clear.   ENT: No nasal discharge. erythematous lesions to left buccal mucosa and tongue  NECK: Non-tender  CARD: Normal S1 S2; Normal rate and rhythm  RESP: Equal BS B/L, No wheezes, rhonchi or rales. No distress  GI: Soft, non-distended.  MS: Normal ROM in all extremities  SKIN: Warm, dry. diffuse erythematous blanchable target lesions with central vesicle to b/l UE, b/l LE, trunk, face, palms, ears  NEURO: A&Ox3, No focal deficits. Strength 5/5 with no sensory deficits. Steady gait    TESTS & MEASUREMENTS:                        12.6   7.73  )-----------( 301      ( 07 Aug 2024 05:19 )             37.9     08-    140  |  104  |  15  ----------------------------<  98  4.0   |  26  |  0.8    Ca    9.4      07 Aug 2024 05:19    TPro  6.6  /  Alb  4.3  /  TBili  0.3  /  DBili  x   /  AST  14  /  ALT  14  /  AlkPhos  64  08      LIVER FUNCTIONS - ( 07 Aug 2024 05:19 )  Alb: 4.3 g/dL / Pro: 6.6 g/dL / ALK PHOS: 64 U/L / ALT: 14 U/L / AST: 14 U/L / GGT: x           Urinalysis Basic - ( 07 Aug 2024 05:19 )    Color: x / Appearance: x / SG: x / pH: x  Gluc: 98 mg/dL / Ketone: x  / Bili: x / Urobili: x   Blood: x / Protein: x / Nitrite: x   Leuk Esterase: x / RBC: x / WBC x   Sq Epi: x / Non Sq Epi: x / Bacteria: x  INFECTIOUS DISEASES TESTING       DIANA DIXON  39y, Female  Allergy: Compazine (Hives)      CHIEF COMPLAINT: Diffuse Rash w/ Mucosal involvement (07 Aug 2024 10:14)      HPI:  40 yo female w/ hx significant for Erythema multiforme () presenting for worsening, diffuse painful rash with vesicles w/ mucosal involvement. Over the past year, patient has had 5 episodes of a rash that begins as a localized macule/papule that then spreads to diffuse targetoid lesions w/ vesicles. A few months ago, her dermatologist prescribed clobetasol and the rash improved.  She went to [place] on  and she noticed a red macule on her right elbow and was uncertain if she got bit by a mosquito. Her rash began spreading similar to prior episodes; she went to a pharmacy in [facility] where she purchased betamethasone, stated it helped a little bit. Pt stated she had a sore throat and SOB for one week for which she took Augmentin x 5 days one week before she left for [facility].    After returning from [facility], she noticed that the rash was getting worse with more vesicles so she went to the ED. She denies fevers, chills, myalgias, arthralgias; reports burning in her mouth from the ulcers; rash is on lips, L buccal mucosa, anterior/posterior thorax, and b/l UE/LE with palms and soles affected. She denies sick contacts; she lives with her daughter who has never had any rashes/lesions similar to hers. Per pt no other travel companions have a similar rash.     Infectious Diseases History:  Old Micro Data/Cultures:     FAMILY HISTORY:  No pertinent family history in first degree relatives    PAST MEDICAL & SURGICAL HISTORY:  H/O erythema multiforme    H/O  section    Status post hysteroscopic myomectomy    SOCIAL HISTORY  Social History: not currently sexually active  Recent Travel: [place] (-)    ROS  General: Denies rigors, nightsweats  HEENT: Denies headache, rhinorrhea, sore throat, eye pain  CV: Denies CP, palpitations  PULM: Denies wheezing, hemoptysis  GI: Denies hematemesis, hematochezia, melena  : Denies discharge, hematuria  MSK: Denies arthralgias, myalgias  SKIN: painful, itchy, lesions all over body   NEURO: Denies paresthesias, weakness  PSYCH: Denies depression, anxiety    VITALS:  T(F): 97.4, Max: 98 (24 @ 02:52)  HR: 68  BP: 122/75  RR: 17Vital Signs Last 24 Hrs  T(C): 36.3 (07 Aug 2024 07:27), Max: 36.7 (07 Aug 2024 02:52)  T(F): 97.4 (07 Aug 2024 07:27), Max: 98 (07 Aug 2024 02:52)  HR: 68 (07 Aug 2024 07:27) (55 - 68)  BP: 122/75 (07 Aug 2024 07:27) (121/86 - 160/93)  BP(mean): --  RR: 17 (07 Aug 2024 07:27) (14 - 17)  SpO2: 99% (07 Aug 2024 07:27) (99% - 100%)    Parameters below as of 07 Aug 2024 07:27  Patient On (Oxygen Delivery Method): room air      PHYSICAL EXAM:  EYES: EOMI, Sclera and conjunctiva clear.   ENT: No nasal discharge. erythematous lesions to left buccal mucosa and tongue  NECK: Non-tender  CARD: Normal S1 S2; Normal rate and rhythm  RESP: Equal BS B/L, No wheezes, rhonchi or rales. No distress  GI: Soft, non-distended.  MS: Normal ROM in all extremities  SKIN: Warm, dry. diffuse erythematous blanchable target lesions with central vesicle to b/l UE, b/l LE, trunk, face, palms, ears  NEURO: A&Ox3, No focal deficits. Strength 5/5 with no sensory deficits. Steady gait    TESTS & MEASUREMENTS:                        12.6   7.73  )-----------( 301      ( 07 Aug 2024 05:19 )             37.9     08-    140  |  104  |  15  ----------------------------<  98  4.0   |  26  |  0.8    Ca    9.4      07 Aug 2024 05:19    TPro  6.6  /  Alb  4.3  /  TBili  0.3  /  DBili  x   /  AST  14  /  ALT  14  /  AlkPhos  64  08-      LIVER FUNCTIONS - ( 07 Aug 2024 05:19 )  Alb: 4.3 g/dL / Pro: 6.6 g/dL / ALK PHOS: 64 U/L / ALT: 14 U/L / AST: 14 U/L / GGT: x           Urinalysis Basic - ( 07 Aug 2024 05:19 )    Color: x / Appearance: x / SG: x / pH: x  Gluc: 98 mg/dL / Ketone: x  / Bili: x / Urobili: x   Blood: x / Protein: x / Nitrite: x   Leuk Esterase: x / RBC: x / WBC x   Sq Epi: x / Non Sq Epi: x / Bacteria: x  INFECTIOUS DISEASES TESTING

## 2024-08-07 NOTE — H&P ADULT - NSHPLABSRESULTS_GEN_ALL_CORE
12.6   7.73  )-----------( 301      ( 07 Aug 2024 05:19 )             37.9       08-07    140  |  104  |  15  ----------------------------<  98  4.0   |  26  |  0.8    Ca    9.4      07 Aug 2024 05:19    TPro  6.6  /  Alb  4.3  /  TBili  0.3  /  DBili  x   /  AST  14  /  ALT  14  /  AlkPhos  64  08-07        Urinalysis Basic - ( 07 Aug 2024 05:19 )    Color: x / Appearance: x / SG: x / pH: x  Gluc: 98 mg/dL / Ketone: x  / Bili: x / Urobili: x   Blood: x / Protein: x / Nitrite: x   Leuk Esterase: x / RBC: x / WBC x   Sq Epi: x / Non Sq Epi: x / Bacteria: x

## 2024-08-07 NOTE — ED PROVIDER NOTE - ATTENDING APP SHARED VISIT CONTRIBUTION OF CARE
39-year-old female with potential prior history of erythema multiforme who is presenting for 5-day history of itchy rash that started in the right elbow and now has spread to other areas.  Prior to that approximately a week ago patient was treated with Augmentin for URI symptoms.  States that in the past she has had target lesions without blisters.  Currently denies any eye involvement fever shortness of breath abdominal pain nausea vomiting diarrhea or vaginal lesions.  On exam the patient has erythematous lesions with vesicles on top some areas that are ruptured discrete lesions with different stages.  Is not tender to touch.  There is clear vesicle lesions over the circular lesions.  There is evidence of vesicles in the lower lip and tongue.  It is located on the extremities and trunk.  Negative Nikolsky sign.  The eyes appear normal.  Plan is to obtain labs consult burn

## 2024-08-07 NOTE — CONSULT NOTE ADULT - SUBJECTIVE AND OBJECTIVE BOX
39-year-old female with potential prior history of erythema multiforme who is presenting for 5-day history of itchy rash that started in the right elbow and now has spread to other areas.  Prior to that approximately a week ago patient was treated with Augmentin for URI symptoms, traveled to [facility] 8/2 and felt a bug bite to right elbow where rash began. States that in the past she has had target lesions without blisters, has seen dermatologist and prescribed topical medications. Currently denies any eye involvement fever shortness of breath chest pain abdominal pain nausea vomiting diarrhea or vaginal lesions.  On exam the patient has erythematous lesions with vesicles on top some areas that are ruptured discrete lesions with different stages.  Is not tender to touch.  There is clear vesicle lesions over the circular lesions.  There is evidence of vesicles in the lower lip and tongue.  It is located on the extremities and trunk.  Negative Nikolsky sign.      Skin: Warm, dry. diffuse erythematous blanchable target lesions with central vesicle to b/l UE, b/l LE, trunk, face, palms,

## 2024-08-07 NOTE — CONSULT NOTE ADULT - SUBJECTIVE AND OBJECTIVE BOX
HPI:  Patient is a 39-year-old female w/ hx significant for Erythema multiforme () who presents for worsening, diffuse painful rash with vesicles w/ mucosal involvement.    Over the past year, patient has had 5 episodes of a rash that begins as a localized macule/papule that then spreads to diffuse targetoid lesions w/ vesicles. A few months ago, her dermatologist prescribed clobetasol and the rash improved.    She went to [place] on  and she noticed a red macule on her right elbow and was uncertain if a bug bit her or not. Her rash began spreading similar to prior episodes; clobetasol did not work and doctors in [place] attempted betamethasone w/o improvement as well as augmentin.     On arrival from her trip, she noticed that the rash was still worsening with more vesicles than prior times and so she went to the ED. She denies fevers, chills, myalgias, arthralgias; reports burning in her mouth from the ulcers; rash is on lips, L buccal mucosa, anterior/posterior thorax, and b/l UE/LE with palms and soles affected. She denies sick contacts; she lives with her daughter who has never had any rashes/lesions similar to hers.    In the ED, bp 122/75, hr 68, T97.4, WBC 7, otherwise labs unremarkable  She was given benadryl, tested for Mpox, varicella, being admitted for erythema multiforme flare vs work up for other infectious etiologies. (07 Aug 2024 10:14)      PAST MEDICAL & SURGICAL HISTORY:  H/O erythema multiforme      H/O  section      Status post hysteroscopic myomectomy            MEDICATIONS  (STANDING):    MEDICATIONS  (PRN):  acetaminophen     Tablet .. 650 milliGRAM(s) Oral every 6 hours PRN Temp greater or equal to 38C (100.4F), Mild Pain (1 - 3)  aluminum hydroxide/magnesium hydroxide/simethicone Suspension 30 milliLiter(s) Oral every 4 hours PRN Dyspepsia  benzocaine 20% Spray 1 Spray(s) Topical every 6 hours PRN mouth ulcer pain  melatonin 3 milliGRAM(s) Oral at bedtime PRN Insomnia  ondansetron Injectable 4 milliGRAM(s) IV Push every 8 hours PRN Nausea and/or Vomiting      Allergies    Compazine (Hives)    Intolerances        SOCIAL HISTORY:    FAMILY HISTORY:  No pertinent family history in first degree relatives        Vital Signs Last 24 Hrs  T(C): 36.3 (07 Aug 2024 07:27), Max: 36.7 (07 Aug 2024 02:52)  T(F): 97.4 (07 Aug 2024 07:27), Max: 98 (07 Aug 2024 02:52)  HR: 68 (07 Aug 2024 07:27) (55 - 68)  BP: 122/75 (07 Aug 2024 07:) (121/86 - 160/93)  BP(mean): --  RR: 17 (07 Aug 2024 07:27) (14 - 17)  SpO2: 99% (07 Aug 2024 07:) (99% - 100%)    Parameters below as of 07 Aug 2024 07:27  Patient On (Oxygen Delivery Method): room air          LABS:                        12.6   7.73  )-----------( 301      ( 07 Aug 2024 05:19 )             37.9     08-    140  |  104  |  15  ----------------------------<  98  4.0   |  26  |  0.8    Ca    9.4      07 Aug 2024 05:19    TPro  6.6  /  Alb  4.3  /  TBili  0.3  /  DBili  x   /  AST  14  /  ALT  14  /  AlkPhos  64  08-07      Urinalysis Basic - ( 07 Aug 2024 05:19 )    Color: x / Appearance: x / SG: x / pH: x  Gluc: 98 mg/dL / Ketone: x  / Bili: x / Urobili: x   Blood: x / Protein: x / Nitrite: x   Leuk Esterase: x / RBC: x / WBC x   Sq Epi: x / Non Sq Epi: x / Bacteria: x        PHYSICAL EXAM:  GENERAL: NAD, lying in bed comfortably  HEENT: rash over lips, oral mucosa, tongue and gums  SKIN: purplish targetoid lesions and papules with vesicles in the center, involving hands, palms, extremities and trunk, with skin erosion on Rt elbow  CHEST/LUNG: Clear to auscultation bilaterally; No rales, rhonchi, wheezing, or rubs. Unlabored respirations  HEART: Regular rate and rhythm  ABDOMEN: Soft, Nontender, Nondistended  EXTREMITIES: No clubbing, cyanosis, or edema  NERVOUS SYSTEM:  Alert & Oriented X3, speech clear. No deficits     Patient is a 39-year-old female w/ hx significant for Erythema multiforme () who complains of itchy and tender  skin lesions worst on upper extremities and L lower lip.  On  (~ 8:15 pm)  the lesions were less itchy after benadryl was given and acyclovir was started.   She had the first episode of Erythema multiforme on palms and UE about a year ago. 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 She said that she had testing with Ob/Gyn that showed past exposure to HSV (IgG) at that time.  Over the past year, patient has had 5 episodes of a rash that begins as a localized macule/papule that then spreads to diffuse targetoid lesions w/ vesicles, sometimes with lip involvement, but not as severe as this time and usually limited to the hands an upper extremities. A few months ago, her dermatologist prescribed clobetasol and other topical steroids and the rash improved.    She went to [place] on  and she noticed a red macule on her right elbow and was uncertain if a bug bit her or not. Her rash began spreading similar to prior episodes;  did not work and doctors in [place] attempted betamethasone w/o improvement. Patient said that she took the augmentin for possible mild URI symptoms which started before [place].      On arrival from her trip, she noticed that the rash was still worsening with more vesicles than prior times and so she went to the ED. She denies fevers, chills, myalgias, arthralgias; reports burning in her L lower lip and buccal mucosa from erosion; the target lesions spread in addition to the blt UE to anterior/posterior thorax, and b/l UE/LE with palms and soles affected. She denies sick contacts; she lives with her daughter who has never had any rashes/lesions similar to hers.    No fever, no chills, no h/a no visual changes.     In the ED, bp 122/75, hr 68, T97.4, WBC 7, otherwise labs unremarkable  She was given benadryl, tested for Mpox, varicella, being admitted for erythema multiforme flare vs work up for other infectious etiologies. (07 Aug 2024 10:14)      PAST MEDICAL & SURGICAL HISTORY:  H/O erythema multiforme      H/O  section      Status post hysteroscopic myomectomy            MEDICATIONS  (STANDING):    MEDICATIONS  (PRN):  acetaminophen     Tablet .. 650 milliGRAM(s) Oral every 6 hours PRN Temp greater or equal to 38C (100.4F), Mild Pain (1 - 3)  aluminum hydroxide/magnesium hydroxide/simethicone Suspension 30 milliLiter(s) Oral every 4 hours PRN Dyspepsia  benzocaine 20% Spray 1 Spray(s) Topical every 6 hours PRN mouth ulcer pain  melatonin 3 milliGRAM(s) Oral at bedtime PRN Insomnia  ondansetron Injectable 4 milliGRAM(s) IV Push every 8 hours PRN Nausea and/or Vomiting      Allergies    Compazine (Hives)    Intolerances        SOCIAL HISTORY:    FAMILY HISTORY:  No pertinent family history in first degree relatives        Vital Signs Last 24 Hrs  T(C): 36.3 (07 Aug 2024 07:27), Max: 36.7 (07 Aug 2024 02:52)  T(F): 97.4 (07 Aug 2024 07:27), Max: 98 (07 Aug 2024 02:52)  HR: 68 (07 Aug 2024 07:27) (55 - 68)  BP: 122/75 (07 Aug 2024 07:27) (121/86 - 160/93)  BP(mean): --  RR: 17 (07 Aug 2024 07:27) (14 - 17)  SpO2: 99% (07 Aug 2024 07:27) (99% - 100%)    Parameters below as of 07 Aug 2024 07:27  Patient On (Oxygen Delivery Method): room air          LABS:                        12.6   7.73  )-----------( 301      ( 07 Aug 2024 05:19 )             37.9     08-    140  |  104  |  15  ----------------------------<  98  4.0   |  26  |  0.8    Ca    9.4      07 Aug 2024 05:19    TPro  6.6  /  Alb  4.3  /  TBili  0.3  /  DBili  x   /  AST  14  /  ALT  14  /  AlkPhos  64  08-07      Urinalysis Basic - ( 07 Aug 2024 05:19 )    Color: x / Appearance: x / SG: x / pH: x  Gluc: 98 mg/dL / Ketone: x  / Bili: x / Urobili: x   Blood: x / Protein: x / Nitrite: x   Leuk Esterase: x / RBC: x / WBC x   Sq Epi: x / Non Sq Epi: x / Bacteria: x        PHYSICAL EXAM:  GENERAL: NAD, lying in bed comfortably,, studying for a final exam (~ 8:15 PM, exam done in person)  HEENT: Left lower lip and left lower buccal mucosa with tender erosion   SKIN: purplish targetoid lesions and papules with vesicles in the center, involving hands, palms, extremities and trunk, with skin erosion on Rt elbow  CHEST/LUNG: Clear to auscultation bilaterally; No rales, rhonchi, wheezing, or rubs. Unlabored respirations  HEART: Regular rate and rhythm  ABDOMEN: Soft, Nontender, Nondistended  EXTREMITIES: No clubbing, cyanosis, or edema  NERVOUS SYSTEM:  Alert & Oriented X3, speech clear. No deficits

## 2024-08-07 NOTE — CONSULT NOTE ADULT - ASSESSMENT
No burn intervention required.   Rash is not consistent with SJS  Rec ID c/s, Derm c/s, Swab and culture lesions

## 2024-08-07 NOTE — H&P ADULT - NSHPREVIEWOFSYSTEMS_GEN_ALL_CORE
REVIEW OF SYSTEMS      General: anxious	  Skin/Breast: diffuse painful rash  	  Ophthalmologic: no vision changes  	  ENMT: painful oral lesions	    Respiratory and Thorax: no sob  	  Cardiovascular: no cp, palpitations, edema	    Gastrointestinal: no abd pain, n,v,d	    Genitourinary: no dysuria	    Musculoskeletal: no myalgia	    Neurological: no HA,dizziness

## 2024-08-07 NOTE — ED ADULT TRIAGE NOTE - CHIEF COMPLAINT QUOTE
pt co diffuse rash since returning from [facility] on 8/2, co pain, itching and burn.

## 2024-08-07 NOTE — ED ADULT NURSE NOTE - CHIEF COMPLAINT QUOTE
pt co diffuse rash since returning from [facility] on 8/2, co pain, itching and burn.

## 2024-08-07 NOTE — CONSULT NOTE ADULT - ASSESSMENT
Patient is a 39-year-old female w/ hx significant for Erythema multiforme (2023) who presents for worsening, diffuse painful rash with vesicles w/ mucosal involvement.    Derm consulted for diffuse erythematous rash that looks like target lesions with vesicles in the center of some lesions, with oral mucosa involvement. Had URI Sx about 1 week ago and took augmentin for 5 days then traveled to [place] 8/2-8/6. Denies any fever, chills, headaches, vision problems. Reports burning pain and itching around the rashes. Pt. reports prior Hx of erythema multiforme(most recently 2023), she saw a dermatologist and has tried multiple cream including Clobetasol, Triamcinolone, Betamethasone and Fluocinonide. 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      ASSESSMENT AND PLAN:  Erythema multiforme- possibly 2/2 HSV vs mycoplasma    - Recommend starting antiviral- Acyclovir or Valacyclovir, per ID recommendations  - Consider topical steroids- Clobetasol or Fluocinonide cream, lidocaine 2% mouth wash or maalox for painful oral lesions  - Consider Claritin qam and Benadryl qhs for itching   Patient is a 39-year-old female w/ hx significant for Erythema multiforme (2023) who presents for worsening, diffuse painful rash with vesicles w/ mucosal involvement.    Derm consulted for diffuse erythematous rash that looks like target lesions with vesicles in the center of some lesions, with oral mucosa involvement. Had URI Sx about 1 week ago and took augmentin for 5 days then traveled to [place] 8/2-8/6. Denies any fever, chills, headaches, vision problems. Reports burning pain and itching around the rashes. Pt. reports prior Hx of erythema multiforme(most recently 2023), she saw a dermatologist and has tried multiple cream including Clobetasol, Triamcinolone, Betamethasone and Fluocinonide. 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      ASSESSMENT AND PLAN:  Erythema multiforme- possibly 2/2 HSV; r/o mycoplasma    - Recommend starting antiviral- Acyclovir or Valacyclovir, per ID recommendations  - Consider topical steroids- Clobetasol or Fluocinonide cream, lidocaine 2% mouth wash or maalox for painful oral lesions  - Consider Claritin qam and Benadryl qhs for itching   Patient is a 39-year-old female w/ hx significant for Erythema multiforme (2023) who presents for worsening, diffuse painful rash with vesicles w/ mucosal involvement.    Derm consulted for diffuse erythematous rash that looks like target lesions with vesicles in the center of some lesions, with oral mucosa involvement in the context of 5 recurrent milder episodes of Erythema multiforme minor in the past year with no previous URI and reporting serology with Ob/Gyn showing past HSV exposure. Patient also had URI Sx about 1 week ago and took augmentin for 5 days then traveled to [place] 8/2-8/6. Denies any fever, chills, headaches, vision problems. Reports burning pain and itching around the rashes. She saw a dermatologist multiple times and has tried multiple cream including Clobetasol, Triamcinolone, Betamethasone and Fluocinonide which in general help. 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      ASSESSMENT AND PLAN:  Reoccuring Erythema multiforme minor, with no other mucosal involvement at this time other than than L lip/ mucosal involvement- likely secondary to  HSV; r/o mycoplasma:    - Continue antiviral- inpatient Acyclovir or outpatient Valacyclovir erythema multiforme dose, as per ID recommendations- patient would like to be discharged soon to take care of her daughther and take the final exam Thursday. Agree with Azithrhomicin recommended for ID, it might help with healing of skin erosions like on the R elbow too.   - f/u on testing but m-pox unlikely in the context of 5 episodes of recurrent erythema multiforme lesions with target lesions.  - Consider topical steroids to decrease the tender inflammation as with past episodes it helped- Clobetasol or Fluocinonide cream, lidocaine 2% mouth wash or maalox for painful oral lesions  - Consider Claritin qam and Benadryl qhs for itching  Please call with any questions or worsening of skin lesions.

## 2024-08-07 NOTE — ED ADULT NURSE NOTE - NSFALLUNIVINTERV_ED_ALL_ED
Bed/Stretcher in lowest position, wheels locked, appropriate side rails in place/Call bell, personal items and telephone in reach/Instruct patient to call for assistance before getting out of bed/chair/stretcher/Non-slip footwear applied when patient is off stretcher/[place] to call system/Physically safe environment - no spills, clutter or unnecessary equipment/Purposeful proactive rounding/Room/bathroom lighting operational, light cord in reach

## 2024-08-07 NOTE — H&P ADULT - ASSESSMENT
Patient is a 39-year-old female w/ hx significant for Erythema multiforme (2023) who presents for worsening, diffuse target rashes with well-formed borders, erythematous circular papules w/ hyperpigmentation/vesicles in center at different stagesw/ mucosal involvement.    Over the past year, patient has had 5 episodes of a rash that begins as a localized macule/papule that then spreads to diffuse targetoid lesions w/ vesicles. A few months ago, her dermatologist prescribed clobetasol and the rash improved.    She went to [place] on 8/2 and she noticed a red macule on her right elbow and was uncertain if a bug bit her or not. Her rash began spreading similar to prior episodes; clobetasol did not work and doctors in [place] attempted betamethasone w/o improvement as well as augmentin. She denies sick contacts; she lives with her daughter who has never had any rashes/lesions similar to hers.    In the ED, bp 122/75, hr 68, T97.4, WBC 7, otherwise labs unremarkable  She is being admitted for erythema multiforme flare vs work up for other infectious etiologies.    #Erythema Multiforme flare, r/o varicella, monkeypox  - lesions not c/w erythema migrans, denies tick bites, doubt lyme disease  - worsening targetoid lesions w/ vesicles that evolved from mac/pap on R elbow suspicious for EM  - negative nikolksy sign, lack of fever,myalgia, arthralgia make SJS less likely  - s/p clobetasol, betamethasone, and benadryl w/o improvement  - swab/culture lesions  - f/u ID and Derm consults  - consider topical steroids if rash continues to worsen  - hurricaine spray for oral ulcers    -------------------------------------------------------------------------------------------------------------------------------------  #DVT PPX: not indicated  #GI PPX: not indicated  #Diet: reg  #Code Status: full  #Activity Order: ambulate as tolerated   #Dispo/Needs: inpt, home when stable    #Handoff  - f/u ID/derm, monkeypox/varicella results

## 2024-08-07 NOTE — H&P ADULT - HISTORY OF PRESENT ILLNESS
Patient is a 39-year-old female w/ hx significant for Erythema multiforme (2023) who presents for worsening, diffuse painful rash with vesicles w/ mucosal involvement.    Over the past year, patient has had 5 episodes of a rash that begins as a localized macule/papule that then spreads to diffuse targetoid lesions w/ vesicles. A few months ago, her dermatologist prescribed clobetasol and the rash improved.    She went to [place] on 8/2 and she noticed a red macule on her right elbow and was uncertain if a bug bit her or not. Her rash began spreading similar to prior episodes; clobetasol did not work and doctors in [place] attempted betamethasone w/o improvement as well as augmentin.     On arrival from her trip, she noticed that the rash was still worsening with more vesicles than prior times and so she went to the ED. She denies fevers, chills, myalgias, arthralgias; reports burning in her mouth from the ulcers; rash is on lips, L buccal mucosa, anterior/posterior thorax, and b/l UE/LE with palms and soles affected. She denies sick contacts; she lives with her daughter who has never had any rashes/lesions similar to hers.    In the ED, bp 122/75, hr 68, T97.4, WBC 7, otherwise labs unremarkable  She was given benadryl, tested for Mpox, varicella, being admitted for erythema multiforme flare vs work up for other infectious etiologies.

## 2024-08-07 NOTE — CONSULT NOTE ADULT - ASSESSMENT
38 yo female w/ hx significant for Erythema multiforme (2023) presenting for worsening, diffuse painful rash with vesicles w/ mucosal involvement. Over the past year, patient has had 5 episodes of a rash that begins as a localized macule/papule that then spreads to diffuse targetoid lesions w/ vesicles. A few months ago, her dermatologist prescribed clobetasol and the rash improved.  She went to [place] on 8/2 and she noticed a red macule on her right elbow and was uncertain if she got bit by a mosquito. Her rash began spreading similar to prior episodes; clobetasol did not work and doctors in [place] attempted betamethasone w/o improvement as well as augmentin. Of note, she states she was taking Augmentin x 5 days one week before she left for [facility] due to URI symptoms.     After returning from [facility], she noticed that the rash was still worsening with more vesicles than prior times and so she went to the ED. She denies fevers, chills, myalgias, arthralgias; reports burning in her mouth from the ulcers; rash is on lips, L buccal mucosa, anterior/posterior thorax, and b/l UE/LE with palms and soles affected. She denies sick contacts; she lives with her daughter who has never had any rashes/lesions similar to hers. Per pt no other travel companions have a similar rash.     IMPRESSION  #Erythema Multiforme flare, r/o varicella, monkeypox  - lesions not c/w erythema migrans, denies tick bites, doubt lyme disease  - worsening targetoid lesions w/ vesicles that evolved from mac/pap on R elbow suspicious for EM  - negative nikolksy sign, lack of fever,myalgia, arthralgia make SJS less likely  - s/p clobetasol, betamethasone, and benadryl w/o improvement  - consider topical steroids if rash continues to worsen  - hurricaine spray for oral ulcers    RECOMMENDATIONS  -     This is a pended note. All final recommendations to follow pending discussion with ID Attending    38 yo female w/ hx significant for Erythema multiforme (2023) presenting for worsening, diffuse painful rash with vesicles w/ mucosal involvement. Over the past year, patient has had 5 episodes of a rash that begins as a localized macule/papule that then spreads to diffuse targetoid lesions w/ vesicles. A few months ago, her dermatologist prescribed clobetasol and the rash improved.  She went to [place] on 8/2 and she noticed a red macule on her right elbow and was uncertain if she got bit by a mosquito. Her rash began spreading similar to prior episodes; she went to a pharmacy in [facility] where she purchased betamethasone, stated it helped a little bit. Of note, she states she was taking Augmentin x 5 days one week before she left for [facility] due to URI symptoms.     After returning from [facility], she noticed that the rash was sworsening with more vesicles so she went to the ED. She denies fevers, chills, myalgias, arthralgias; reports burning in her mouth from the ulcers; rash is on lips, L buccal mucosa, anterior/posterior thorax, and b/l UE/LE with palms and soles affected. She denies sick contacts; she lives with her daughter who has never had any rashes/lesions similar to hers. Per pt no other travel companions have a similar rash.     IMPRESSION  #Erythema Multiforme flare, r/o varicella, monkeypox  - lesions not c/w erythema migrans, denies tick bites, doubt lyme disease  - worsening targetoid lesions w/ vesicles that evolved from mac/pap on R elbow suspicious for EM  - negative nikolksy sign, lack of fever,myalgia, arthralgia make SJS less likely  - s/p clobetasol, betamethasone, and benadryl w/o improvement  - consider topical steroids if rash continues to worsen  - hurricaine spray for oral ulcers    RECOMMENDATIONS  -     This is a pended note. All final recommendations to follow pending discussion with ID Attending    40 yo female w/ hx significant for Erythema multiforme (2023) presenting for worsening, diffuse painful rash with vesicles w/ mucosal involvement. Over the past year, patient has had 5 episodes of a rash that begins as a localized macule/papule that then spreads to diffuse targetoid lesions w/ vesicles. A few months ago, her dermatologist prescribed clobetasol and the rash improved.  She went to [place] on 8/2 and she noticed a red macule on her right elbow and was uncertain if she got bit by a mosquito. Her rash began spreading similar to prior episodes; she went to a pharmacy in [facility] where she purchased betamethasone, stated it helped a little bit. Pt stated she had a sore throat and SOB for one week for which she took Augmentin x 5 days one week before she left for [facility].    After returning from [facility], she noticed that the rash was getting worse with more vesicles so she went to the ED. She denies fevers, chills, myalgias, arthralgias; reports burning in her mouth from the ulcers; rash is on lips, L buccal mucosa, anterior/posterior thorax, and b/l UE/LE with palms and soles affected. She denies sick contacts; she lives with her daughter who has never had any rashes/lesions similar to hers. Per pt no other travel companions have a similar rash.     IMPRESSION  #Erythema Multiforme 2/2 mycoplasma pneumoniae   - lesions not c/w erythema migrans, denies tick bites   - worsening targetoid lesions w/ vesicles that evolved from mac/pap on R elbow suspicious for EM  - s/p clobetasol, betamethasone, and benadryl w/o improvement  - URI symptoms 1 wk prior to travel     RECOMMENDATIONS  -Azithromycin  mg  -Benzocaine spray  -Magic mouthwash    This is a pended note. All final recommendations to follow pending discussion with ID Attending   *INCOMPLETE*   38 yo female w/ hx significant for Erythema multiforme (2023) presenting for worsening, diffuse painful rash with vesicles w/ mucosal involvement. Over the past year, patient has had 5 episodes of a rash that begins as a localized macule/papule that then spreads to diffuse targetoid lesions w/ vesicles. A few months ago, her dermatologist prescribed clobetasol and the rash improved.  She went to [place] on 8/2 and she noticed a red macule on her right elbow and was uncertain if she got bit by a mosquito. Her rash began spreading similar to prior episodes; she went to a pharmacy in [facility] where she purchased betamethasone, stated it helped a little bit. Pt stated she had a sore throat and SOB for one week for which she took Augmentin x 5 days one week before she left for [facility].    After returning from [facility], she noticed that the rash was getting worse with more vesicles so she went to the ED. She denies fevers, chills, myalgias, arthralgias; reports burning in her mouth from the ulcers; rash is on lips, L buccal mucosa, anterior/posterior thorax, and b/l UE/LE with palms and soles affected. She denies sick contacts; she lives with her daughter who has never had any rashes/lesions similar to hers. Per pt no other travel companions have a similar rash.     IMPRESSION  #Erythema Multiforme 2/2 mycoplasma pneumoniae   - lesions not c/w erythema migrans, denies tick bites   - worsening targetoid lesions w/ vesicles that evolved from mac/pap on R elbow suspicious for EM  - s/p clobetasol, betamethasone, and benadryl w/o improvement  - URI symptoms 1 wk prior to travel     RECOMMENDATIONS  -Azithromycin  mg  -Benzocaine spray  -Magic mouthwash

## 2024-08-07 NOTE — H&P ADULT - NSHPPHYSICALEXAM_GEN_ALL_CORE
VITALS:   T(C): 36.3 (08-07-24 @ 07:27), Max: 36.7 (08-07-24 @ 02:52)  HR: 68 (08-07-24 @ 07:27) (55 - 68)  BP: 122/75 (08-07-24 @ 07:27) (121/86 - 160/93)  RR: 17 (08-07-24 @ 07:27) (14 - 17)  SpO2: 99% (08-07-24 @ 07:27) (99% - 100%)    GENERAL: anixous, lying in bed  HEAD:  Atraumatic, normocephalic  EYES: EOMI, PERRLA, conjunctiva and sclera clear  ENT: vesicles on L lip, lesions on L tongue/buccal mucosa  Moist mucous membranes  NECK: Supple, no JVD  HEART: Regular rate and rhythm, no murmurs, rubs, or gallops  LUNGS: Unlabored respirations.  Clear to auscultation bilaterally, no crackles, wheezing, or rhonchi  ABDOMEN: Soft, nontender, nondistended, +BS  EXTREMITIES: 2+ peripheral pulses bilaterally. No clubbing, cyanosis, or edema  NERVOUS SYSTEM:  A&Ox3, no focal deficits   SKIN: diffuse target rashes, well-formed borders, erythematous circular papules w/ hyperpigmentation/vesicles in center at different stages

## 2024-08-07 NOTE — PHARMACOTHERAPY INTERVENTION NOTE - COMMENTS
To assist with medication dosing and estimation of kidney function, updated patient's height for this admission to 152.4cm, as this was the height previously documented in [facility], and there is no height documented for this admission.

## 2024-08-08 LAB
ANA TITR SER: NEGATIVE — SIGNIFICANT CHANGE UP
HIV 1+2 AB+HIV1 P24 AG SERPL QL IA: SIGNIFICANT CHANGE UP
HSV DNA1: SIGNIFICANT CHANGE UP
HSV DNA2: SIGNIFICANT CHANGE UP
HSV1 DNA BLD QL NAA+PROBE: SIGNIFICANT CHANGE UP
HSV2 DNA BLD QL NAA+PROBE: SIGNIFICANT CHANGE UP
T PALLIDUM AB TITR SER: NEGATIVE — SIGNIFICANT CHANGE UP

## 2024-08-08 RX ORDER — AZITHROMYCIN 250 MG
1 TABLET ORAL
Qty: 5 | Refills: 0
Start: 2024-08-08 | End: 2024-08-12

## 2024-08-08 RX ORDER — CLOBETASOL PROPIONATE 0.5 MG/G
1 EMULSION TOPICAL
Qty: 1 | Refills: 0
Start: 2024-08-08 | End: 2024-08-22

## 2024-08-08 RX ORDER — ACYCLOVIR 800 MG
1 TABLET ORAL
Qty: 30 | Refills: 0
Start: 2024-08-08 | End: 2024-08-17

## 2024-08-08 NOTE — DISCHARGE NOTE PROVIDER - NSDCFUADDAPPT_GEN_ALL_CORE_FT
Do you need a primary care doctor or follow-up with a specialist? Our care coordinators will help you find providers near you and schedule any follow-up care visits.    Monday-Friday: 9am-5pm    Call our [facility] team: [phone]

## 2024-08-08 NOTE — DISCHARGE NOTE PROVIDER - CARE PROVIDERS DIRECT ADDRESSES
[email or web address]

## 2024-08-08 NOTE — DISCHARGE NOTE NURSING/CASE MANAGEMENT/SOCIAL WORK - NSDCPEFALRISK_GEN_ALL_CORE
For information on Fall & Injury Prevention, visit: [email or web address] OR  [email or web address] OR  https://www.cdc.gov/steadi/patient.html

## 2024-08-08 NOTE — DISCHARGE NOTE NURSING/CASE MANAGEMENT/SOCIAL WORK - NSDCFUADDAPPT_GEN_ALL_CORE_FT
Do you need a primary care doctor or follow-up with a specialist? Our care coordinators will help you find providers near you and schedule any follow-up care visits.    Monday-Friday: 9am-5pm    Call our [facility] team: [phone]

## 2024-08-08 NOTE — ED ADULT NURSE REASSESSMENT NOTE - NS ED NURSE REASSESS COMMENT FT1
Pt requesting to go home after completing consults with infectious disease and dermatology. Pt seen by MD Perdomo and approved pt discharge. Discharge paperwork completed and printed by RN.

## 2024-08-08 NOTE — DISCHARGE NOTE PROVIDER - NSDCMRMEDTOKEN_GEN_ALL_CORE_FT
acyclovir 400 mg oral tablet: 1 tab(s) orally every 8 hours  azithromycin 500 mg oral tablet: 1 tab(s) orally once a day for 5 dyas  benzocaine 20% topical spray: Apply topically to affected area 3 times a day 1 Apply topically to affected area every 6 hours As needed mouth ulcer pain  clobetasol 0.05% topical cream: Apply topically to affected area once a day 1 Apply topically to affected area 2 times a day

## 2024-08-08 NOTE — DISCHARGE NOTE PROVIDER - HOSPITAL COURSE
Patient is a 39-year-old female w/ hx significant for Erythema multiforme (2023) who presents for worsening, diffuse painful rash with vesicles w/ mucosal involvement.    Over the past year, patient has had 5 episodes of a rash that begins as a localized macule/papule that then spreads to diffuse targetoid lesions w/ vesicles. A few months ago, her dermatologist prescribed clobetasol and the rash improved.    She went to [place] on 8/2 and she noticed a red macule on her right elbow and was uncertain if a bug bit her or not. Her rash began spreading similar to prior episodes; clobetasol did not work and doctors in [place] attempted betamethasone w/o improvement as well as augmentin.     On arrival from her trip, she noticed that the rash was still worsening with more vesicles than prior times and so she went to the ED. She denies fevers, chills, myalgias, arthralgias; reports burning in her mouth from the ulcers; rash is on lips, L buccal mucosa, anterior/posterior thorax, and b/l UE/LE with palms and soles affected. She denies sick contacts; she lives with her daughter who has never had any rashes/lesions similar to hers.    In the ED, bp 122/75, hr 68, T97.4, WBC 7, otherwise labs unremarkable  She was given benadryl, tested for Mpox, varicella, being admitted for erythema multiforme flare vs work up for other infectious etiologies.    Seen by ID, started on azithromycin and PO acyclovir.  Seen by derm, recommended symptomatic management with topical steroids.    Patient is clinically and hemodynamically stable for discharge.    Discussion of discharge plan of care, including discharge diagnoses, medication reconciliation, and follow-ups was conducted with Dr. Martín Salinas on 8/7/2024, and discharge was approved.

## 2024-08-08 NOTE — DISCHARGE NOTE PROVIDER - NSDCFUSCHEDAPPT_GEN_ALL_CORE_FT
Drew Jimenez Physician [facility]  [facility] [address]  Scheduled Appointment: 08/09/2024

## 2024-08-08 NOTE — DISCHARGE NOTE PROVIDER - PROVIDER TOKENS
PROVIDER:[TOKEN:[[id number]],FOLLOWUP:[1-3 days],ESTABLISHEDPATIENT:[T]],PROVIDER:[TOKEN:[[id number]],FOLLOWUP:[1 week]],PROVIDER:[TOKEN:[[id number]],FOLLOWUP:[1 week]]

## 2024-08-08 NOTE — DISCHARGE NOTE PROVIDER - CARE PROVIDER_API CALL
Angelica Anders Patient  Follow Up Time: 1-3 days    Keegan Kumar  Infectious Disease  [address] [place] [address]  Phone: [phone]  Fax: [phone]  Follow Up Time: 1 week    Jerry Newellhai  Dermatology  [address] [place] [address]  Phone: [phone]  Fax: [phone]  Follow Up Time: 1 week

## 2024-08-08 NOTE — DISCHARGE NOTE PROVIDER - NSDCCPCAREPLAN_GEN_ALL_CORE_FT
PRINCIPAL DISCHARGE DIAGNOSIS  Diagnosis: Rash  Assessment and Plan of Treatment: Your skin rash was seocndary to erythema multiforme. Please take your medications and creams as prescribed and stay well hydrated. Also please follow-up in the clinics with your PCP.

## 2024-08-08 NOTE — DISCHARGE NOTE NURSING/CASE MANAGEMENT/SOCIAL WORK - PATIENT PORTAL LINK FT
You can access the FollowMyHealth Patient Portal offered by [facility] by registering at the following website: [email or web address]. By joining [employer]’s FollowMyHealth portal, you will also be able to view your health information using other applications (apps) compatible with our system.

## 2024-08-09 ENCOUNTER — APPOINTMENT (OUTPATIENT)
Dept: ORTHOPEDIC SURGERY | Facility: CLINIC | Age: 39
End: 2024-08-09

## 2024-08-09 LAB
HAV IGM SER-ACNC: SIGNIFICANT CHANGE UP
HBV CORE IGM SER-ACNC: SIGNIFICANT CHANGE UP
HBV SURFACE AG SER-ACNC: SIGNIFICANT CHANGE UP
HCV AB S/CO SERPL IA: 0.11 S/CO — SIGNIFICANT CHANGE UP (ref 0–0.99)
HCV AB SERPL-IMP: SIGNIFICANT CHANGE UP
HSV1 IGG SER-ACNC: 0.21 INDEX — SIGNIFICANT CHANGE UP
HSV1 IGG SERPL QL IA: NEGATIVE — SIGNIFICANT CHANGE UP
HSV2 IGG FLD-ACNC: >23.6 INDEX — HIGH
HSV2 IGG SERPL QL IA: POSITIVE

## 2024-08-10 LAB
M PNEUMO IGM SER-ACNC: 0.89 INDEX — SIGNIFICANT CHANGE UP (ref 0–0.9)
MYCOPLASMA AG SPEC QL: NEGATIVE — SIGNIFICANT CHANGE UP
NONVAR ORTHPX DNA SPEC QL NAA+PROBE: SIGNIFICANT CHANGE UP

## 2024-08-11 LAB — VZV DNA, PCR RESULT: NEGATIVE — SIGNIFICANT CHANGE UP

## 2024-08-14 LAB
RF IGA SER-ACNC: <5 U — SIGNIFICANT CHANGE UP
RF IGG SER-ACNC: <5 U — SIGNIFICANT CHANGE UP
RF IGM SER-ACNC: <5 U — SIGNIFICANT CHANGE UP

## 2024-08-15 ENCOUNTER — TRANSCRIPTION ENCOUNTER (OUTPATIENT)
Age: 39
End: 2024-08-15

## 2024-08-15 LAB — C IMMITIS AB SER QL IA: REACTIVE — SIGNIFICANT CHANGE UP

## 2024-08-17 DIAGNOSIS — B00.9 HERPESVIRAL INFECTION, UNSPECIFIED: ICD-10-CM

## 2024-08-17 DIAGNOSIS — B96.0 MYCOPLASMA PNEUMONIAE [M. PNEUMONIAE] AS THE CAUSE OF DISEASES CLASSIFIED ELSEWHERE: ICD-10-CM

## 2024-08-17 DIAGNOSIS — L51.9 ERYTHEMA MULTIFORME, UNSPECIFIED: ICD-10-CM

## 2024-08-26 NOTE — ED ADULT NURSE NOTE - FINAL NURSING ELECTRONIC SIGNATURE
08-Aug-2024 01:03